# Patient Record
Sex: MALE | Race: BLACK OR AFRICAN AMERICAN | NOT HISPANIC OR LATINO
[De-identification: names, ages, dates, MRNs, and addresses within clinical notes are randomized per-mention and may not be internally consistent; named-entity substitution may affect disease eponyms.]

---

## 2021-04-06 ENCOUNTER — TRANSCRIPTION ENCOUNTER (OUTPATIENT)
Age: 41
End: 2021-04-06

## 2021-04-16 ENCOUNTER — TRANSCRIPTION ENCOUNTER (OUTPATIENT)
Age: 41
End: 2021-04-16

## 2022-04-29 ENCOUNTER — APPOINTMENT (OUTPATIENT)
Dept: ORTHOPEDIC SURGERY | Facility: CLINIC | Age: 42
End: 2022-04-29
Payer: COMMERCIAL

## 2022-04-29 VITALS — HEIGHT: 70 IN | BODY MASS INDEX: 32.93 KG/M2 | WEIGHT: 230 LBS

## 2022-04-29 PROBLEM — Z00.00 ENCOUNTER FOR PREVENTIVE HEALTH EXAMINATION: Status: ACTIVE | Noted: 2022-04-29

## 2022-04-29 PROCEDURE — 99204 OFFICE O/P NEW MOD 45 MIN: CPT

## 2022-04-29 PROCEDURE — 73030 X-RAY EXAM OF SHOULDER: CPT | Mod: RT

## 2022-04-29 RX ORDER — NAPROXEN 500 MG/1
TABLET ORAL
Refills: 0 | Status: ACTIVE | COMMUNITY

## 2022-04-29 NOTE — PHYSICAL EXAM
[NL (0-180)] : full active forward flexion 0-180 degrees [] : positive Marco [Bilateral] : shoulder bilaterally [FreeTextEntry8] : left ac  [FreeTextEntry9] : symm er [FreeTextEntry1] : mild gh djd [de-identified] : external rotation 60 degrees [TWNoteComboBox6] : internal rotation L1

## 2022-04-29 NOTE — HISTORY OF PRESENT ILLNESS
[Dull/Aching] : dull/aching [Sharp] : sharp [Shooting] : shooting [Stabbing] : stabbing [Throbbing] : throbbing [Constant] : constant [Nothing helps with pain getting better] : Nothing helps with pain getting better [Full time] : Work status: full time [de-identified] : Patient presents today with bilateral shoulder pain, left shoulder over 1 year, and the right a few months with NKI. Previously saw Dr. Mcclure for the left shoulder. Pain is 7/10 in left shoulder and 3-4/10 in right shoulder. Taking Alekarine LOONEYN. Had left shoulder MRI done in 2020 at [facility]. no pain in right, just clicking. pain laterally on left. waking from pain, rhd.  [] : no [FreeTextEntry1] : BIlateral Shoulders [de-identified] : Overhead motions [de-identified] : Dr Mcclure [de-identified] : Left Shoulder MRI at [facility] [de-identified] :  at [facility]

## 2022-04-29 NOTE — DATA REVIEWED
[MRI] : MRI [Left] : left [Shoulder] : shoulder [FreeTextEntry1] : Impression: 1. AC joint hypertrophy with supraspinatus tendinopathy. 2. Labral foramen favored over SLAP tear. 3. Joint effusion. 4. Biceps tenosynovitis. 5. Capsular thickening more noted anteriorly, which can be seen with adhesive capsulitis in the right clinical setting.

## 2022-05-03 RX ORDER — NAPROXEN 500 MG/1
500 TABLET ORAL
Qty: 30 | Refills: 0 | Status: ACTIVE | COMMUNITY
Start: 2022-05-03 | End: 1900-01-01

## 2022-06-10 ENCOUNTER — APPOINTMENT (OUTPATIENT)
Dept: ORTHOPEDIC SURGERY | Facility: CLINIC | Age: 42
End: 2022-06-10
Payer: COMMERCIAL

## 2022-06-10 DIAGNOSIS — Z78.9 OTHER SPECIFIED HEALTH STATUS: ICD-10-CM

## 2022-06-10 DIAGNOSIS — M75.42 IMPINGEMENT SYNDROME OF LEFT SHOULDER: ICD-10-CM

## 2022-06-10 DIAGNOSIS — M75.41 IMPINGEMENT SYNDROME OF RIGHT SHOULDER: ICD-10-CM

## 2022-06-10 PROCEDURE — 99213 OFFICE O/P EST LOW 20 MIN: CPT

## 2022-06-10 NOTE — ASSESSMENT
[FreeTextEntry1] : Patient responding well to therapy, and was given an Rx for continue Pt today. \par \par F/u PRN

## 2022-06-10 NOTE — PHYSICAL EXAM
[NL (0-180)] : full active forward flexion 0-180 degrees [Bilateral] : shoulder bilaterally [] : negative Lara [FreeTextEntry8] : left ac  [FreeTextEntry9] : symm er, Ir T8 symm [FreeTextEntry1] : mild gh djd [TWNoteComboBox6] : False [de-identified] : external rotation 80 degrees

## 2023-07-31 ENCOUNTER — NON-APPOINTMENT (OUTPATIENT)
Age: 43
End: 2023-07-31